# Patient Record
Sex: MALE | Race: WHITE | NOT HISPANIC OR LATINO
[De-identification: names, ages, dates, MRNs, and addresses within clinical notes are randomized per-mention and may not be internally consistent; named-entity substitution may affect disease eponyms.]

---

## 2022-08-30 PROBLEM — Z00.00 ENCOUNTER FOR PREVENTIVE HEALTH EXAMINATION: Status: ACTIVE | Noted: 2022-08-30

## 2022-08-31 ENCOUNTER — APPOINTMENT (OUTPATIENT)
Dept: ORTHOPEDIC SURGERY | Facility: CLINIC | Age: 52
End: 2022-08-31

## 2022-08-31 VITALS — HEIGHT: 71 IN | BODY MASS INDEX: 31.5 KG/M2 | WEIGHT: 225 LBS

## 2022-08-31 PROCEDURE — 76882 US LMTD JT/FCL EVL NVASC XTR: CPT | Mod: RT,59

## 2022-08-31 PROCEDURE — 99204 OFFICE O/P NEW MOD 45 MIN: CPT | Mod: 25

## 2022-08-31 PROCEDURE — 73030 X-RAY EXAM OF SHOULDER: CPT | Mod: RT

## 2022-08-31 PROCEDURE — 20611 DRAIN/INJ JOINT/BURSA W/US: CPT

## 2022-08-31 NOTE — DISCUSSION/SUMMARY
[de-identified] : ULTRASOUND EVALUATION  REVEALS INFLAMMATORY CHANGES WITHOUT SIGNIFICANT TEAR \par PATIENT HAS ELECTED TO PROCEED WITH KENALOG INJECTION SHOULDER \par RISKS AND BENEFITS DISCUSSED - VERBAL CONSENT OBTAINED \par SEE PROCEDURE NOTE\par \par \par POST INJECTION INSTRUCTIONS:\par \par INJECTION THERAPY HANDOUT PROVIDED\par \par COLD THERAPY , ANALGESICS PRN\par \par HOME  EXERCISES QD - TBAND 2   HANDOUT PROVIDED, REVIEWED AND DEMONSTRATED - REFERRED TO INSTRUCTIONAL VIDEO ON MY WEBSITE\par \par START P.T.  WITHIN 2 WEEKS AFTER INJECTION - 2 X 4 WEEKS \par \par MRI IF NO RELIEF 12 WEEKS\par

## 2022-08-31 NOTE — PHYSICAL EXAM
[de-identified] : PHYSICAL EXAM  RIGHT  SHOULDER\par \par MODERATE SCAPULAR PROTRACTION\par AROM 140 / 140 / 70 / 10\par TENDER: SA REGION ANTERIOR \par \par SPECIAL TESTING :\par DOBBS - POSITIVE \par CHANDANA - POSITIVE \par SPEED TEST - POSITIVE\par \par NATH - NEGATIVE \par APPREHENSION AND SUPPRESSION - NEGATIVE \par \par RC STRENGTH TESTING \par SS:  5/5\par SUB 5/5\par IS     5/5\par BICEPS  5/5\par \par SENSATION  - GROSSLY INTACT\par \par \par  [de-identified] : RIGHT SHOULDER XRAY (2 VIEWS - AP AND OUTLET) -  \par NO OBVIOUS FRACTURE ,  SEPARATION OR DISLOCATION \par NO SIGNIFICANT OSTEOARTHRITIS,\par TYPE 2B ACROMION \par CSA= 24.0\par

## 2022-08-31 NOTE — HISTORY OF PRESENT ILLNESS
[de-identified] : RIGHT SHOULDER PAIN- RHD \par STARTED AUGUST 23, 2022- STARTED WORKING WITH A - MIGHT BE FROM SHOULDER TAP EXERCISES \par WOKE UP FEELING PAIN IN RIGHT SHOULDER \par HAS IMPROVED SINCE INJURY \par LOCALIZED \par PAIN LEVEL:  6-7/10 \par SHARP, DULL \par PT WORKS AT A DESKTOP \par WORSE WITH BENDING, REACHING, PULLING \par BETTER WITH  STRETCHES, RESTING, ADVIL

## 2022-08-31 NOTE — PROCEDURE
[de-identified] : DIAGNOSTIC ULTRASOUND RIGHT SHOULDER\par \par DIAGNOSTIC SONOGRAPHY of the Rotator Cuff Soft Tissue of the RIGHT SHOULDER was performed in Multiple Scan Planes with varying transducer frequencies.\par Imaging of the Supraspinatus Tendon reveals TENDONITIS, BURSITIS, ARTICULAR SIDE DEGENERATION  WITH OUT SIGNIFICANT / COMPLETE TEAR\par Imaging of the Biceps Tendon reveals no significant tear.\par Imaging of the Subscapularis Tendon reveals no significant tear.\par Imaging of the Infraspinatus Tendon reveals no significant tear.\par Key images were save digitally and reviewed with patient.\par \par \par INJECTION RIGHT SHOULDER SA SPACE\par \par Patient has demonstrated limited relief from NSAIDS, rest, exercises / PT, and after discussion of the risks and benefits, the patient has elected to proceed with an ULTRASOUND GUIDED injection into the RIGHT SUBACROMIAL  SPACE LATERAL APPROACH \par  \par Confirmed that the patient does not have history of prior adverse reactions, active, infections, or relevant allergies. There was no effusion, erythema, or warmth, and the skin was clear\par \par The skin was sterilized with alcohol. Ethyl Chloride was used as a topical anesthetic. Routine sterile technique. \par The site was injected UTILIZING ULTRASOUND GUIDANCE to confirm appropriate placement of the needle-\par with a mixture of medication and local anesthetic. The injection was completed without complication and a bandage was applied.\par  \par The patient tolerated the procedure well and was given post-injection instructions.Rec: Cold therapy, analgesics, avoid heavy activity.\par MEDICATION: 4cc of 1% xylocaine + 40mg of KENALOG\par \par

## 2022-09-08 ENCOUNTER — APPOINTMENT (OUTPATIENT)
Dept: ORTHOPEDIC SURGERY | Facility: CLINIC | Age: 52
End: 2022-09-08

## 2022-09-08 PROCEDURE — 73562 X-RAY EXAM OF KNEE 3: CPT | Mod: LT

## 2022-09-08 PROCEDURE — 99214 OFFICE O/P EST MOD 30 MIN: CPT

## 2022-09-08 RX ORDER — DICLOFENAC SODIUM 75 MG/1
75 TABLET, DELAYED RELEASE ORAL TWICE DAILY
Qty: 60 | Refills: 5 | Status: ACTIVE | COMMUNITY
Start: 2022-09-08 | End: 1900-01-01

## 2022-09-08 RX ORDER — ACETAMINOPHEN 325 MG/1
325 TABLET, FILM COATED ORAL EVERY 6 HOURS
Qty: 60 | Refills: 0 | Status: ACTIVE | COMMUNITY
Start: 2022-09-08

## 2022-09-08 NOTE — PHYSICAL EXAM
[de-identified] : PHYSICAL EXAM LEFT KNEE\par \par AROM\par EXTENSION = 0\par FLEXION = 125 \par \par TENDER: MEDIAL JOINT LINE EXTEND POSTERIOR \par \par SPECIAL TESTS\par \par PATELLAR GRIND = NEG\par DRAWER  = NEG\par LACHMAN = NEG\par MACMURRAY = PAINFUL \par \par MOTOR = GROSSLY INTACT\par SENSORY = GROSSLY INTACT \par \par \par  [de-identified] : XRAY LEFT KNEE:\par 4 views of the knee\par GRADE 1 OA \par No obvious fracture or dislocation. \par Alignment within normal limits\par \par \par

## 2022-09-08 NOTE — DISCUSSION/SUMMARY
[de-identified] : LEFT KNEE PAIN 3 YEARS \par WORSE 1.5 NONTHS - NO NEW INJURY - WORSE WITH WALKING \par \par POSSIBLE MEDIAL MENISCUS TEAR\par \par PLAN\par COLD PACK , ELEVATION \par DICLOFECNAC BID - ALTERNATE WITH TYLENOL\par \par P.T. 2 X 4 WEEKS\par \par MRI LEFT KNEE \par \par FU AFTER MRI\par

## 2022-09-08 NOTE — HISTORY OF PRESENT ILLNESS
[de-identified] : LEFT KNEE PAIN \par PAIN STARTED 3 YEARS AGO- NO SPECIFY INJURY \par HAS GOTTEN WORSE ONE MONTH AGO \par PAIN LEVEL: 4/10 \par STIFFNESS AND SWELLING\par DISCOMFORT  \par BETTER WITH STRETCHING \par WORSE WITH  WALKING\par PT DID PHYSICAL THERPAY IN 2019 \par \par \par 2008- right knee arthroscopy

## 2022-11-09 ENCOUNTER — APPOINTMENT (OUTPATIENT)
Dept: ORTHOPEDIC SURGERY | Facility: CLINIC | Age: 52
End: 2022-11-09

## 2022-11-09 DIAGNOSIS — M75.41 IMPINGEMENT SYNDROME OF RIGHT SHOULDER: ICD-10-CM

## 2022-11-09 DIAGNOSIS — M75.81 OTHER SHOULDER LESIONS, RIGHT SHOULDER: ICD-10-CM

## 2022-11-09 PROCEDURE — 99213 OFFICE O/P EST LOW 20 MIN: CPT

## 2022-11-09 NOTE — HISTORY OF PRESENT ILLNESS
[de-identified] : RIGHT SHOULDER PAIN- RHD \par STILL USING A - AVOIDING SHOULDER TAPS AND UTILIZING HOME WORKOUTS \par AUG 31, 2022 - KENALOG 40MG INJECTION - VERY HELPFUL\par HAS IMPROVED SINCE INJURY \par LOCALIZED \par PAIN LEVEL: 1/10 \par SHARP, DULL \par PT WORKS AT A DESKTOP \par IMPROVED WITH PT

## 2022-11-09 NOTE — REASON FOR VISIT
[Follow-Up Visit] : a follow-up visit for [Shoulder Pain] : shoulder pain [FreeTextEntry2] : PAINFUL AND A LITTLE STIFF AT NIGHT

## 2022-11-09 NOTE — DISCUSSION/SUMMARY
[de-identified] : MUCH IMPROVED\par \par ADD DOORWAY AND SLEEPER STRETCH \par \par "EXERCISING WITH SHOULDER PAIN"

## 2022-11-09 NOTE — PHYSICAL EXAM
[de-identified] : PHYSICAL EXAM RIGHT SHOULDER\par \par MODERATE SCAPULAR PROTRACTION\par AROM 150 / 150 / 80 / 20\par TENDER: SA REGION ANTERIOR \par \par SPECIAL TESTING :\par DBOBS - POSITIVE \par CHANDANA - POSITIVE \par SPEED TEST - POSITIVE\par \par NATH - NEGATIVE \par APPREHENSION AND SUPPRESSION - NEGATIVE \par \par RC STRENGTH TESTING \par SS: 5/5\par SUB 5/5\par IS 5/5\par BICEPS 5/5\par \par SENSATION - GROSSLY INTACT\par \par \par

## 2023-02-02 ENCOUNTER — APPOINTMENT (OUTPATIENT)
Dept: ORTHOPEDIC SURGERY | Facility: CLINIC | Age: 53
End: 2023-02-02
Payer: COMMERCIAL

## 2023-02-02 DIAGNOSIS — M17.11 UNILATERAL PRIMARY OSTEOARTHRITIS, RIGHT KNEE: ICD-10-CM

## 2023-02-02 DIAGNOSIS — M25.461 EFFUSION, RIGHT KNEE: ICD-10-CM

## 2023-02-02 PROCEDURE — 20611 DRAIN/INJ JOINT/BURSA W/US: CPT

## 2023-02-02 PROCEDURE — 99213 OFFICE O/P EST LOW 20 MIN: CPT | Mod: 25

## 2023-02-02 PROCEDURE — 73562 X-RAY EXAM OF KNEE 3: CPT | Mod: RT

## 2023-02-02 RX ORDER — CELECOXIB 200 MG/1
200 CAPSULE ORAL TWICE DAILY
Qty: 60 | Refills: 5 | Status: ACTIVE | COMMUNITY
Start: 2023-02-02 | End: 1900-01-01

## 2023-02-02 NOTE — DISCUSSION/SUMMARY
[de-identified] : CELECOXIB PRESCRIBED\par COLD PACKS AND ELASTIC COMPRESSION  SLEEVE\par MODIFY SQUATS AT GYM \par \par CONSIDER MONOVISC INJECTIONS

## 2023-02-02 NOTE — HISTORY OF PRESENT ILLNESS
[de-identified] : LOCATION: RIGHT KNEE \par DURATION:2 MONTHS AGO- PT STATED HE HAS BONE SPUR IN BOTH FEET-KNEE PAIN MIGHT BE FROM PAIN IN FOOT \par QUALITY: ACHY, DULL, THROBBING \par  INTERMITTENT \par PAIN LEVEL: 4/10\par BETTER WITH REST, COLD COMPRESS \par WORSE WITH WALKING, GOING UP/DOWN STAIRS  \par ASSOCIATED SYMPTOMS: STIFFNESS, SWELLING \par  JULY 2008- RIGHT KNEE ARTHROSCOPY - DR. JOJO STILL \par \par \par

## 2023-02-02 NOTE — PHYSICAL EXAM
[de-identified] : PHYSICAL EXAM BILATERAL KNEES\par \par MODERATE EFFUSION RIGHT KNEE \par AROM\par RIGHT  0- 130\par LEFT    0-130 \par \par SPECIAL TESTS\par \par PATELLAR GRIND = GRIND \par DRAWER  = NEG\par LACHMAN = NEG\par MACMURRAY = NEG \par \par MOTOR = GROSSLY INTACT\par SENSORY = GROSSLY INTACT \par \par \par  [de-identified] : XRAY RIGHT KNEE:\par 4 views of the knee\par GRADE 2 OA MEDIAL AND LATERAL PF\par No obvious fracture or dislocation. \par Alignment within normal limits\par \par \par

## 2023-02-02 NOTE — PROCEDURE
[de-identified] : INJECTION / ASPIRATION  RIGHT KNEE\par \par Patient has demonstrated limited relief from NSAIDS, rest, exercises / PT , and after discussion of the risks and benefits, the patient has elected to proceed with a\par n ULTRASOUND GUIDED corticosteroid injection into the RIGHT SupeLat PF Knee\par  \par Confirmed that the patient does not have history of prior adverse reactions, active, infections, or relevant allergies. There was no effusion, erythema, or warmth, and the skin was clear\par \par The skin was sterilized with alcohol. Ethyl Chloride was used as a topical anesthetic. Routine sterile technique. \par  \par The KNEE JOINT  was injected utilizing ULTRASOUND GUIDANCEwith KENALOG + LIDOCAINE. The injection was completed without complication and a bandage was applied.\par \par The patient tolerated the procedure well and was given post-injection instructions.Rec: Cold therapy, analgesics, avoid heavy activity.\par \par MEDICATION: Lidocaine 1% 4cc + 10mg of Kenalog\par \par EFFUSION ASPIRATED: 24CC CLEAR YELLOW \par

## 2023-08-02 ENCOUNTER — APPOINTMENT (OUTPATIENT)
Dept: ORTHOPEDIC SURGERY | Facility: CLINIC | Age: 53
End: 2023-08-02
Payer: COMMERCIAL

## 2023-08-02 DIAGNOSIS — S83.105A UNSPECIFIED DISLOCATION OF LEFT KNEE, INITIAL ENCOUNTER: ICD-10-CM

## 2023-08-02 PROCEDURE — 99213 OFFICE O/P EST LOW 20 MIN: CPT

## 2023-08-02 NOTE — DISCUSSION/SUMMARY
[de-identified] : PT RECENTLY DID MORE THAN 6 WEEKS  OF PHYSCIAL THERPAY - NO RELIEF   MRI ORDERED

## 2023-08-02 NOTE — HISTORY OF PRESENT ILLNESS
[de-identified] : LEFT KNEE PAIN  FOLLOW UP  NO RELIEF  CONSTANT PAIN  MRI WAS DENIED  PAIN LEVEL: 7/10  PT RECENTLY DID MORE THAN 6 WEEKS  OF PHYSCIAL THERPAY - NO RELIEF  CONTINUES HOME EXERCISES  MORE PAIN WHEN TRANSFERRING WEIGHT TO LEG AND GETING UP FROM CHAIR    PREVIOUS HPI LEFT KNEE PAIN PAIN STARTED 3 YEARS AGO- NO SPECIFY INJURY HAS GOTTEN WORSE ONE MONTH AGO PAIN LEVEL: 4/10 STIFFNESS AND SWELLING DISCOMFORT BETTER WITH STRETCHING WORSE WITH WALKING PT DID PHYSICAL THERPAY IN 2019

## 2023-08-02 NOTE — PHYSICAL EXAM
[de-identified] : PHYSICAL EXAM LEFT KNEE AROM  EXTENSION = 0  FLEXION = 120   TENDER: MEDIAL JOINT LINE EXTEND POSTERIOR    SPECIAL TESTS    PATELLAR GRIND = NEG  DRAWER = NEG  LACHMAN = NEG  MACMURRAY = PAINFUL  MOTOR = GROSSLY INTACT  SENSORY = GROSSLY INTACT

## 2023-08-25 ENCOUNTER — APPOINTMENT (OUTPATIENT)
Dept: ORTHOPEDIC SURGERY | Facility: CLINIC | Age: 53
End: 2023-08-25
Payer: COMMERCIAL

## 2023-08-25 PROCEDURE — 99213 OFFICE O/P EST LOW 20 MIN: CPT

## 2023-08-25 RX ORDER — HYALURONATE SODIUM, STABILIZED 88 MG/4 ML
88 SYRINGE (ML) INTRAARTICULAR
Qty: 1 | Refills: 0 | Status: ACTIVE | OUTPATIENT
Start: 2023-08-25

## 2023-08-25 NOTE — PHYSICAL EXAM
[de-identified] : PHYSICAL EXAM LEFT KNEE AROM  EXTENSION = 0  FLEXION = 120   TENDER: MEDIAL JOINT LINE EXTEND POSTERIOR    SPECIAL TESTS    PATELLAR GRIND = NEG  DRAWER = NEG  LACHMAN = NEG  MACMURRAY = PAINFUL  MOTOR = GROSSLY INTACT  SENSORY = GROSSLY INTACT

## 2023-08-31 ENCOUNTER — APPOINTMENT (OUTPATIENT)
Dept: ORTHOPEDIC SURGERY | Facility: CLINIC | Age: 53
End: 2023-08-31
Payer: COMMERCIAL

## 2023-08-31 DIAGNOSIS — S83.272A COMPLEX TEAR OF LATERAL MENISCUS, CURRENT INJURY, LEFT KNEE, INITIAL ENCOUNTER: ICD-10-CM

## 2023-08-31 DIAGNOSIS — M25.462 EFFUSION, LEFT KNEE: ICD-10-CM

## 2023-08-31 DIAGNOSIS — M17.12 UNILATERAL PRIMARY OSTEOARTHRITIS, LEFT KNEE: ICD-10-CM

## 2023-08-31 DIAGNOSIS — S83.232A COMPLEX TEAR OF MEDIAL MENISCUS, CURRENT INJURY, LEFT KNEE, INITIAL ENCOUNTER: ICD-10-CM

## 2023-08-31 PROCEDURE — 99214 OFFICE O/P EST MOD 30 MIN: CPT

## 2023-08-31 NOTE — ASSESSMENT
[FreeTextEntry1] : Lengthy discussion was had with the patient regarding chronicity of symptoms prior treatment options as well as persistent symptoms I reviewed at length with him the MRI and although there is a meniscus tear this is most likely consistent with degenerative tearing given the near complete extrusion of the meniscus treatment options reviewed and at this time patient elects to consider options.  Reviewed at length with patient that given underlying severity arthritis I do not recommend any arthroscopic procedure as ultimately this most likely will not provide any significant relief discussed that he may ultimately require a knee replacement at this time he elects home exercises continued observation ice and will consider viscosupplementation

## 2023-08-31 NOTE — PHYSICAL EXAM
[de-identified] : Left knee  Constitutional:  The patient is healthy-appearing and in no apparent distress.   Gait: The patient ambulates with a normal gait and no limp.  Cardiovascular System:  The capillary refill is less than 2 seconds.   Skin:  There are no skin abnormalities other than a mild effusion  Left Knee:   Bony Palpation:  There is tenderness of the medial joint line.  There is tenderness of the lateral joint line. There is tenderness of the medial femoral chondyle. There is no tenderness of the lateral femoral chondyle. There is no tenderness of the tibial tubercle. There is no tenderness of the superior patella. There is no tenderness of the inferior patella. There is tenderness of the medial patellar facet. There is tenderness of the lateral patellar facet.  Soft Tissue Palpation:  There is no tenderness of the medial retinaculum. There is no tenderness of the lateral retinaculum. There is no tenderness of the quadriceps tendon. There is no tenderness of the patella tendon. There is no tenderness of the ITB. There is no tenderness of the pes anserine.  Active Range of Motion:  The range of motion at the knee actively and passively is full.   Special Tests:  There is a negative Apley. There is a negative Steinmanns.  There is a negative Lachman and Anterior Drawer. There is a negative Posterior Drawer.   There is no varus or valgus laxity.  Strength:  There is 5/5 hip flexion and 5/5 knee flexion and extension.    Psychiatric:  The patient demonstrates a normal mood and affect and is active and alert  [de-identified] :  MRI left knee ( [facility]): There is a complex medial meniscus tear with near complete extrusion off the articular surface there is moderate with areas of severe medial compartment arthritis mild with areas of severe lateral compartment arthritis and moderate with areas of severe patellofemoral arthritis

## 2023-08-31 NOTE — PHYSICAL EXAM
[de-identified] : Left knee  Constitutional:  The patient is healthy-appearing and in no apparent distress.   Gait: The patient ambulates with a normal gait and no limp.  Cardiovascular System:  The capillary refill is less than 2 seconds.   Skin:  There are no skin abnormalities other than a mild effusion  Left Knee:   Bony Palpation:  There is tenderness of the medial joint line.  There is tenderness of the lateral joint line. There is tenderness of the medial femoral chondyle. There is no tenderness of the lateral femoral chondyle. There is no tenderness of the tibial tubercle. There is no tenderness of the superior patella. There is no tenderness of the inferior patella. There is tenderness of the medial patellar facet. There is tenderness of the lateral patellar facet.  Soft Tissue Palpation:  There is no tenderness of the medial retinaculum. There is no tenderness of the lateral retinaculum. There is no tenderness of the quadriceps tendon. There is no tenderness of the patella tendon. There is no tenderness of the ITB. There is no tenderness of the pes anserine.  Active Range of Motion:  The range of motion at the knee actively and passively is full.   Special Tests:  There is a negative Apley. There is a negative Steinmanns.  There is a negative Lachman and Anterior Drawer. There is a negative Posterior Drawer.   There is no varus or valgus laxity.  Strength:  There is 5/5 hip flexion and 5/5 knee flexion and extension.    Psychiatric:  The patient demonstrates a normal mood and affect and is active and alert  [de-identified] :  MRI left knee ( [facility]): There is a complex medial meniscus tear with near complete extrusion off the articular surface there is moderate with areas of severe medial compartment arthritis mild with areas of severe lateral compartment arthritis and moderate with areas of severe patellofemoral arthritis

## 2023-08-31 NOTE — REASON FOR VISIT
[Follow-Up Visit] : a follow-up visit for [Knee Pain] : knee pain [Initial Visit] : an initial visit for

## 2023-10-06 ENCOUNTER — APPOINTMENT (OUTPATIENT)
Dept: ORTHOPEDIC SURGERY | Facility: CLINIC | Age: 53
End: 2023-10-06